# Patient Record
Sex: FEMALE | Race: BLACK OR AFRICAN AMERICAN
[De-identification: names, ages, dates, MRNs, and addresses within clinical notes are randomized per-mention and may not be internally consistent; named-entity substitution may affect disease eponyms.]

---

## 2019-11-28 ENCOUNTER — HOSPITAL ENCOUNTER (EMERGENCY)
Dept: HOSPITAL 62 - ER | Age: 29
Discharge: HOME | End: 2019-11-28
Payer: OTHER GOVERNMENT

## 2019-11-28 VITALS — SYSTOLIC BLOOD PRESSURE: 124 MMHG | DIASTOLIC BLOOD PRESSURE: 70 MMHG

## 2019-11-28 DIAGNOSIS — O20.0: Primary | ICD-10-CM

## 2019-11-28 DIAGNOSIS — R10.30: ICD-10-CM

## 2019-11-28 DIAGNOSIS — Z3A.00: ICD-10-CM

## 2019-11-28 DIAGNOSIS — O99.511: ICD-10-CM

## 2019-11-28 DIAGNOSIS — R10.9: ICD-10-CM

## 2019-11-28 DIAGNOSIS — J45.909: ICD-10-CM

## 2019-11-28 DIAGNOSIS — O26.891: ICD-10-CM

## 2019-11-28 LAB
ADD MANUAL DIFF: NO
ANION GAP SERPL CALC-SCNC: 10 MMOL/L (ref 5–19)
APPEARANCE UR: CLEAR
APTT PPP: (no result) S
BASOPHILS # BLD AUTO: 0.1 10^3/UL (ref 0–0.2)
BASOPHILS NFR BLD AUTO: 0.5 % (ref 0–2)
BILIRUB UR QL STRIP: NEGATIVE
BUN SERPL-MCNC: 10 MG/DL (ref 7–20)
CALCIUM: 9.3 MG/DL (ref 8.4–10.2)
CHLORIDE SERPL-SCNC: 103 MMOL/L (ref 98–107)
CO2 SERPL-SCNC: 24 MMOL/L (ref 22–30)
EOSINOPHIL # BLD AUTO: 0.4 10^3/UL (ref 0–0.6)
EOSINOPHIL NFR BLD AUTO: 3.8 % (ref 0–6)
ERYTHROCYTE [DISTWIDTH] IN BLOOD BY AUTOMATED COUNT: 14 % (ref 11.5–14)
GLUCOSE SERPL-MCNC: 80 MG/DL (ref 75–110)
GLUCOSE UR STRIP-MCNC: NEGATIVE MG/DL
HCT VFR BLD CALC: 38.2 % (ref 36–47)
HGB BLD-MCNC: 13 G/DL (ref 12–15.5)
KETONES UR STRIP-MCNC: 20 MG/DL
LYMPHOCYTES # BLD AUTO: 1.8 10^3/UL (ref 0.5–4.7)
LYMPHOCYTES NFR BLD AUTO: 17.7 % (ref 13–45)
MCH RBC QN AUTO: 28.8 PG (ref 27–33.4)
MCHC RBC AUTO-ENTMCNC: 34.1 G/DL (ref 32–36)
MCV RBC AUTO: 85 FL (ref 80–97)
MONOCYTES # BLD AUTO: 0.5 10^3/UL (ref 0.1–1.4)
MONOCYTES NFR BLD AUTO: 4.9 % (ref 3–13)
NEUTROPHILS # BLD AUTO: 7.3 10^3/UL (ref 1.7–8.2)
NEUTS SEG NFR BLD AUTO: 73.1 % (ref 42–78)
NITRITE UR QL STRIP: NEGATIVE
PH UR STRIP: 5 [PH] (ref 5–9)
PLATELET # BLD: 318 10^3/UL (ref 150–450)
POTASSIUM SERPL-SCNC: 4.1 MMOL/L (ref 3.6–5)
PROT UR STRIP-MCNC: NEGATIVE MG/DL
RBC # BLD AUTO: 4.51 10^6/UL (ref 3.72–5.28)
SP GR UR STRIP: 1.01
TOTAL CELLS COUNTED % (AUTO): 100 %
UROBILINOGEN UR-MCNC: NEGATIVE MG/DL (ref ?–2)
WBC # BLD AUTO: 10 10^3/UL (ref 4–10.5)

## 2019-11-28 PROCEDURE — 86901 BLOOD TYPING SEROLOGIC RH(D): CPT

## 2019-11-28 PROCEDURE — 76817 TRANSVAGINAL US OBSTETRIC: CPT

## 2019-11-28 PROCEDURE — 86900 BLOOD TYPING SEROLOGIC ABO: CPT

## 2019-11-28 PROCEDURE — 36415 COLL VENOUS BLD VENIPUNCTURE: CPT

## 2019-11-28 PROCEDURE — 99284 EMERGENCY DEPT VISIT MOD MDM: CPT

## 2019-11-28 PROCEDURE — 93976 VASCULAR STUDY: CPT

## 2019-11-28 PROCEDURE — 85025 COMPLETE CBC W/AUTO DIFF WBC: CPT

## 2019-11-28 PROCEDURE — 81001 URINALYSIS AUTO W/SCOPE: CPT

## 2019-11-28 PROCEDURE — 80048 BASIC METABOLIC PNL TOTAL CA: CPT

## 2019-11-28 PROCEDURE — 84702 CHORIONIC GONADOTROPIN TEST: CPT

## 2019-11-28 NOTE — ER DOCUMENT REPORT
ED Medical Screen (RME)





- General


Chief Complaint: Vag Bleeding, +preg <12wks


Stated Complaint: ABDOMINAL CRAMPS/VAGINAL BLEEDING


Time Seen by Provider: 11/28/19 11:28


Notes: 





Patient is a 29-year-old female G7, P6 presents to the emergency department for 

vaginal bleeding.  Patient voices she thinks she is approximately 8 weeks 

pregnant.  She did go to her OB/GYN who recently did an ultrasound measuring 

approximately 5 weeks.  Patient voices this morning she had some generalized 

lower abdominal cramping and when she wiped after using the restroom saw bright 

red blood.  Patient voices she did place a pad but has not soaked through said 

pad.





GENERAL: Alert, interacts well. No acute distress.


LUNGS: Clear to auscultation bilaterally, no wheezes, rales, or rhonchi. No 

respiratory distress.


ABDOMEN: Soft, non-tender. Non-distended. Bowel sounds present in all 4 

quadrants.








I have greeted and performed a rapid initial assessment of this patient.  A 

comprehensive ED assessment and evaluation of the patient, analysis of test 

results and completion of the medical decision making process will be conducted 

by additional ED providers.





I have specifically instructed the patient or family members with the patient to

immediately return to any nursing staff should anything change in the patient's 

condition or with their chief complaint.





This medical record was dictated with voice recognizing software.  There may be 

grammatical, syntax errors that are unintended.





- Related Data


Allergies/Adverse Reactions: 


                                        





lactose Allergy (Verified 11/28/19 11:27)


   


latex Allergy (Verified 11/28/19 11:27)


   


No Known Drug Allergies Allergy (Verified 11/28/19 11:27)


   











Physical Exam





- Vital signs


Vitals: 





                                        











Temp Pulse BP Pulse Ox


 


 97.9 F   66   116/71   100 


 


 11/28/19 11:26  11/28/19 11:26  11/28/19 11:26  11/28/19 11:26














Course





- Vital Signs


Vital signs: 





                                        











Temp Pulse Resp BP Pulse Ox


 


 97.9 F   66      116/71   100 


 


 11/28/19 11:26  11/28/19 11:26     11/28/19 11:26  11/28/19 11:26

## 2019-11-28 NOTE — ER DOCUMENT REPORT
ED GI/





- General


Chief Complaint: Vag Bleeding, +preg <12wks


Stated Complaint: ABDOMINAL CRAMPS/VAGINAL BLEEDING


Time Seen by Provider: 11/28/19 11:28


Primary Care Provider: 


BENY ANDUJAR MD [Primary Care Provider] - Follow up as needed


Notes: 





Patient is a 29-year-old female G7, P6 who presents to the emergency department 

with a chief complaint of vaginal bleeding.  Patient reports this morning she 

felt some lower abdominal pressure and as she went to urinate she wiped with the

toilet paper and noticed bright red blood on the tissue.  Patient denies blood 

clots.  Patient reports she did put a pad on afterwards but has not noticed any 

more bleeding.  Patient reports she did have her last menstrual cycle on October 7.  She reports that she did follow-up with [facility] on 

November 25 in which she did have an ultrasound.  She states at that time she 

was told she was 5 weeks and 2 days pregnant.  Patient reports that the lower 

abdominal cramping started today.  Patient denies urinary symptoms.  Patient 

denies vaginal discharge prior to the bleeding.


TRAVEL OUTSIDE OF THE [place] IN LAST 30 DAYS: No





- Related Data


Allergies/Adverse Reactions: 


                                        





lactose Allergy (Verified 11/28/19 11:35)


   


latex Allergy (Verified 11/28/19 11:35)


   


No Known Drug Allergies Allergy (Verified 11/28/19 11:35)


   


nut - unspecified Allergy (Verified 11/28/19 11:35)


   


shellfish derived Allergy (Verified 11/28/19 11:35)


   











Past Medical History





- General


Information source: Patient





- Social History


Smoking Status: Never Smoker


Chew tobacco use (# tins/day): No


Frequency of alcohol use: None


Drug Abuse: None


Lives with: Spouse/Significant other


Family History: None


Patient has suicidal ideation: No


Patient has homicidal ideation: No





- Past Medical History


Cardiac Medical History: Reports: None


Pulmonary Medical History: Reports: Hx Asthma


EENT Medical History: Reports: None


Neurological Medical History: Reports: None


Endocrine Medical History: Reports: None


Renal/ Medical History: Reports: None


Malignancy Medical History: Reports: None


GI Medical History: Reports: None


Musculoskeletal Medical History: Reports None


Skin Medical History: Reports None


Psychiatric Medical History: Reports: None


Traumatic Medical History: Reports: None


Infectious Medical History: Reports: None


Past Surgical History: Reports: None





Review of Systems





- Review of Systems


Constitutional: No symptoms reported


EENT: No symptoms reported


Cardiovascular: No symptoms reported


Respiratory: See HPI


Gastrointestinal: See HPI


Genitourinary: No symptoms reported


Female Genitourinary: See HPI


Musculoskeletal: No symptoms reported


Skin: No symptoms reported


Hematologic/Lymphatic: No symptoms reported


Neurological/Psychological: No symptoms reported





Physical Exam





- Vital signs


Vitals: 


                                        











Temp Pulse BP Pulse Ox


 


 97.9 F   66   116/71   100 


 


 11/28/19 11:26  11/28/19 11:26  11/28/19 11:26  11/28/19 11:26














- Notes


Notes: 





GENERAL: Well-appearing, well-nourished and in no acute distress.


HEAD: Atraumatic, normocephalic.


EYES: Pupils equal round and reactive to light, extraocular movements intact, 

sclera anicteric, conjunctiva are normal.


ENT: Nares patent, oropharynx clear without exudates.  Moist mucous membranes.


NECK: Normal range of motion, supple without lymphadenopathy or JVD.


LUNGS: Breath sounds clear to auscultation bilaterally and equal.  No wheezes 

rales or rhonchi.


HEART: Regular rate and rhythm without murmurs, rubs or gallops.


ABDOMEN: Soft, nontender, normoactive bowel sounds.  No guarding, no rebound.  

No masses appreciated.


BACK: No cervical, thoracic, lumbar midline tenderness.  No saddle anesthesia, 

normal distal neurovascular exam.


GENITOURINARY: Deferred.


EXTREMITIES: Normal range of motion, no pitting or edema.  No clubbing or 

cyanosis.


NEUROLOGICAL: Cranial nerves II through XII grossly intact.  Normal speech, 

normal gait.


PSYCH: Normal mood, normal affect.


SKIN: Warm, Dry, normal turgor, no rashes or lesions noted.





Course





- Re-evaluation


Re-evalutation: 





11/28/19 13:38


I did speak with Dr. Salazar, our on-call OBGYN to report the patient's quant and 

ultrasound read.  She states that it sounds like the patient is having impending

miscarriage.  She reports that the patient needs to follow-up at [facility] on Monday for repeat quant and ultrasound.  She states 

that the patient can take ibuprofen 800 mg 3 times a day as needed for her 

discomfort, as it does sound like she is miscarrying due to the abnormal 

gestational sac.  She states for the patient to return to the emergency 

department over the weekend if she is saturating > 2 pads in 1 hour.  I did 

discuss the findings with the patient.  I did give her strict return 

precautions.  Patient verbalizes understanding.





Patient is O+, RhoGam is not indicated.





11/28/19 13:56








- Vital Signs


Vital signs: 


                                        











Temp Pulse Resp BP Pulse Ox


 


 97.9 F   66      116/71   100 


 


 11/28/19 11:26  11/28/19 11:26     11/28/19 11:26  11/28/19 11:26














- Laboratory


Result Diagrams: 


                                 11/28/19 12:20





                                 11/28/19 12:20


Laboratory results interpreted by me: 


                                        











  11/28/19 11/28/19





  12:20 12:20


 


Sodium  136.9 L 


 


Creatinine  0.41 L 


 


Beta HCG, Quant  9697.60 H 


 


Urine Ketones   20 H


 


Urine Blood   SMALL H














- Diagnostic Test


Radiology reviewed: Reports reviewed


Radiology results interpreted by me: 





11/28/19 13:01





                                        





Transvaginal US  11/28/19 11:29


IMPRESSION:  No identifiable fetal pole is seen.  Cannot exclude a molar 

pregnancy.  Follow-up as clinically indicated.


 














Discharge





- Discharge


Clinical Impression: 


 Vaginal bleeding, Threatened miscarriage





Condition: Stable


Disposition: HOME, SELF-CARE


Additional Instructions: 


Threatened Miscarriage





*Today you are seen in the emergency department for vaginal bleeding.  Your 

ultrasound was concerning for an impending miscarriage.  I did speak with our on

call OB/GYN who does work for [facility].  She does recommend

following up with the office on Monday to have a repeat quant and ultrasound.  

She states that you can take 800 mg of ibuprofen 3 times a day as needed at this

point.  Only take this if you are having pain and discomfort.  If you do not 

want to take ibuprofen you can take Tylenol.  Please return to the emergency 

department if you have severe abdominal pain, if you are soaking through more 

than 2 pads that is saturated in 1 hour.  You may start to have more vaginal 

bleeding that is consistent with a heavy menstrual cycle.


 


Do not douche or have sex for at least a week, or until OK'd by the doctor. 

*Strict pelvic rest until seen by [facility]*


C


all the doctor or return for re-examination if there is an increase in bleeding 

or cramping, or passage of tissue.





Prescriptions: 


Ibuprofen [Motrin 800 mg Tablet] 800 mg PO Q8H PRN #30 tab


 PRN Reason: 


Referrals: 


BENY ANDUJAR MD [Primary Care Provider] - Follow up as needed

## 2019-11-28 NOTE — RADIOLOGY REPORT (SQ)
EXAM DESCRIPTION:  U/S OB TRANSVAG W/DOPPLER



COMPLETED DATE/TIME:  11/28/2019 12:22 pm



REASON FOR STUDY:  +preg and bleeding



COMPARISON:  None.



TECHNIQUE:  Transvaginal static and realtime grayscale images acquired of the pelvis. Additional shonda
cted spectral and color Doppler images recorded. All images stored on PACs.

bHCG: Pending.

CLINICAL DATES:  LMP 10/7/2019 7 weeks 3 days



LIMITATIONS:  None.



FINDINGS:  There is the appearance of an abnormal gestational sac.  No fetal pole is present.  There 
is an 18 x 29 x 31 mm mass of mixed echogenicity either in the gestational sac or adjacent to it.

UTERUS: See above.

CERVICAL LENGTH: 2.7 cm.   Closed.

RIGHT ADNEXA: Normal ovary with normal vascular flow.  3.1 x 2.3 x 1.8 cm.

No adnexal free fluid.

No adnexal masses.

LEFT ADNEXA: Normal ovary with normal vascular flow.  2.3 x 3.1 x 3.7 cm.  There is an 18 x 18 x 24 m
m cyst.

No adnexal free fluid.

No adnexal masses.

FREE FLUID: None.

OTHER: No other significant finding.



IMPRESSION:  No identifiable fetal pole is seen.  Cannot exclude a molar pregnancy.  Follow-up as cli
nically indicated.



TECHNICAL DOCUMENTATION:  JOB ID:  [id number]

 2011 [email or web address]- All Rights Reserved                            rev-5/18



Reading location - IP/workstation name: [email or web address]

## 2020-04-01 ENCOUNTER — HOSPITAL ENCOUNTER (OUTPATIENT)
Dept: HOSPITAL 62 - RDC | Age: 30
End: 2020-04-01
Attending: NURSE PRACTITIONER
Payer: OTHER GOVERNMENT

## 2020-04-01 VITALS — DIASTOLIC BLOOD PRESSURE: 66 MMHG | SYSTOLIC BLOOD PRESSURE: 106 MMHG

## 2020-04-01 DIAGNOSIS — Z3A.23: ICD-10-CM

## 2020-04-01 DIAGNOSIS — J06.9: ICD-10-CM

## 2020-04-01 DIAGNOSIS — R05: ICD-10-CM

## 2020-04-01 DIAGNOSIS — M79.10: ICD-10-CM

## 2020-04-01 DIAGNOSIS — J45.909: ICD-10-CM

## 2020-04-01 DIAGNOSIS — O99.512: Primary | ICD-10-CM

## 2020-04-01 DIAGNOSIS — Z20.828: ICD-10-CM

## 2020-04-01 DIAGNOSIS — R09.89: ICD-10-CM

## 2020-04-01 DIAGNOSIS — R06.02: ICD-10-CM

## 2020-04-01 DIAGNOSIS — R11.0: ICD-10-CM

## 2020-04-01 DIAGNOSIS — R10.9: ICD-10-CM

## 2020-04-01 LAB
A TYPE INFLUENZA AG: NEGATIVE
B INFLUENZA AG: NEGATIVE

## 2020-04-01 PROCEDURE — 87070 CULTURE OTHR SPECIMN AEROBIC: CPT

## 2020-04-01 PROCEDURE — 87635 SARS-COV-2 COVID-19 AMP PRB: CPT

## 2020-04-01 PROCEDURE — 87804 INFLUENZA ASSAY W/OPTIC: CPT

## 2020-04-01 PROCEDURE — 87880 STREP A ASSAY W/OPTIC: CPT

## 2020-04-01 NOTE — ER RDC ASSESSMENT REPORT
Intake





- In the Last 14 days


Have you traveled outside [place]?: No


Have you been in close contact with someone CONFIRMED: No


Worked in Healthcare?: No





- Symptoms


Subjective Fever(Felt feverish): No


Chills: Yes


Muscule Aches: Yes


Runny Nose: Yes


Sore Throat: Yes


Cough (New or worsening chronic cough): Yes


Shortness of breath: Yes


Nausea or Vomiting: Yes


Headache: Yes


Abdominal Pain: Yes


Diarrhea(3 or more loose stools in last 24 hours): No





- Do you have any of the following


Chronic lung disease: Asthma or emphysema or COPD: Yes


Chronic Lung Disease Comment: 





asthma


Cystic Fibrosis: No


Diabetes: No


High Blood Pressure: No


Cardiovascular Disease: No


Chronic Kidney Disease: No


Chronic Liver Disease: No


Chronic blood disorder like Sickle Cell Disease: No


Weak immune system due to disease or medication: No


Neurologic condition that limits movement: No


Developmental delay - Moderate to Severe: No


Recent (within past 2 weeks) or current Pregnancy: No


--If current: Trimester: 2nd


Pregnancy Comment: 





23 weeks


Morbid Obesity (>100 pounds over ideal weight): No





- Objective


Temperature: 96.7 F


Pulse Rate: 78


Respiratory Rate: 14


Blood Pressure: 106/66


O2 Sat by Pulse Oximetry: 96


Objective: 


Given above, testing performed: 








flu A/B, rapid strep all negative





COVID19 sent to [facility]























If Testing Performed:


Test Specimen Type Sent to


[facility] lab





Disposition: Home; Selfcare





General





- General


Chief Complaint: Flu Symptoms


Stated Complaint: dry cough, SOB x 1 day, cold symptoms


Time Seen by Provider: 04/01/20 10:30


Mode of Arrival: Ambulatory


Information source: Patient





- [facility]


Patient complains to provider of: Dry cough, rhinorrhea, headache, emesis x1, 

shortness of breath


Onset: Last week - cold symptoms x 5 days, couhg/sob with exertion x 1 day


Onset/Duration: Gradual


Quality of pain: No pain


Associated symptoms: Body/muscle aches, Chills, Nonproductive cough, Headache, 

Nausea, Vomiting, Rhinnorhea, Shortness of breath, Sore throat


Exacerbated by: Movement, Coughing


Relieved by: Remaining still, Other - albuterol TID


Similar symptoms previously: No


Recently seen / treated by doctor: No





- Related Data


Allergies/Adverse Reactions: 


                                        





lactose Allergy (Verified 11/28/19 11:35)


   


latex Allergy (Verified 11/28/19 11:35)


   


No Known Drug Allergies Allergy (Verified 11/28/19 11:35)


   


nut - unspecified Allergy (Verified 11/28/19 11:35)


   


shellfish derived Allergy (Verified 11/28/19 11:35)


   








Home Medications: Methimazole 5 mg p.o. daily, albuterol inhaler as needed every

4 H





Past Medical History





- General


Information source: Patient





- Social History


Smoking Status: Never Smoker


Lives with: Family


Family History: None





- Past Medical History


Cardiac Medical History: Reports: Hx Heart Murmur


Pulmonary Medical History: Reports: Hx Asthma


EENT Medical History: Reports: None


Neurological Medical History: Reports: None


Endocrine Medical History: Reports: Hx Hyperthyroidism


Renal/ Medical History: Reports: None


Malignancy Medical History: Reports: None


GI Medical History: Reports: None


Musculoskeletal Medical History: Reports None


Skin Medical History: Reports None


Psychiatric Medical History: Reports: None


Traumatic Medical History: Reports: None


Infectious Medical History: Reports: None


Past Surgical History: Reports: None





Physical Exam





- Vital signs


Interpretation: Normal





- General


General appearance: Appears well, Alert


In distress: None


Notes: 





PHYSICAL EXAMINATION: 


GENERAL: Well-appearing and in no acute distress. 


HEAD: Atraumatic, normocephalic. 


EYES: sclera anicteric, conjunctiva are normal. 


ENT: nares patent. + clear rhinorrhea. Moist mucous membranes. 


NECK: Normal range of motion, supple without lymphadenopathy 


LUNGS: CTAB and equal. No wheezes rales or rhonchi. 


HEART: Regular rate and rhythm, + murmur 


ABDOMEN: Gravid, nontender, normal bowel sounds, no guarding. 


EXTREMITIES: Normal range of motion, no pitting edema. No cyanosis. 


NEUROLOGICAL: Cranial nerves grossly intact. Normal speech. 


PSYCH: Normal mood, normal affect. 


SKIN: Warm, Dry, normal turgor.








Patient Education/Counseling


Counseling/Education: 





Patient presents with upper respiratory symptoms worrisome for possible Covid 

19.  Patient does not have emergency worrying symptoms such as moderate to 

severe dyspnea or shortness of breath, chest pain, pressure, confusion or 

cyanosis.  Patient appears suitable for discharge.  Patient's vital signs are 

stable and patient is nontoxic in appearance.  Good return precautions have been

discussed with patient, patient verbalized understanding and is agreeable with 

discharge plan of care at this time.


Guidance for worsening S/SX: 





As a person under investigation for Covid 19, the [facility] of 

Health and Human Services, division of public health advises you to adhere to 

the following guidance until your test results are reported to you.  If your 

test result is positive, you will receive additional information from your 

provider and your local health department at that time.


 


Remain at home until you are cleared by the health provider or public health 

authorities.


 


Keep a log of visitors to your home, notify any visitors to your home of your 

isolation status.


 


If you plan to move to a new address or leave the county, notify the local 

health department in your County.


 


Call your doctor or seek care if you have an urgent medical need.  Before 

seeking medical care, call ahead to get instructions from the provider before 

arriving at the medical office clinic or hospital.  Notify them that you are 

being tested for the virus that causes Covid 19 so that arrangements can be 

made, as necessary, to prevent transmission to others in the healthcare setting.

 Next, notify the local health department in your county.


 


If a medical emergency arises and you need to call 911, inform the first 

responders that you are being tested for the virus that causes Covid 19.  Next, 

notify the local health department in your county.





RDC Discharge





- Discharge


Clinical Impression: 


 COVID 19 screen





URI (upper respiratory infection)


Qualifiers:


 URI type: unspecified URI Qualified Code(s): J06.9 - Acute upper respiratory 

infection, unspecified





Condition: Stable


Disposition: Home; Selfcare

## 2020-06-02 ENCOUNTER — HOSPITAL ENCOUNTER (EMERGENCY)
Dept: HOSPITAL 62 - ER | Age: 30
Discharge: HOME | End: 2020-06-02
Payer: OTHER GOVERNMENT

## 2020-06-02 VITALS — SYSTOLIC BLOOD PRESSURE: 114 MMHG | DIASTOLIC BLOOD PRESSURE: 70 MMHG

## 2020-06-02 DIAGNOSIS — Z91.013: ICD-10-CM

## 2020-06-02 DIAGNOSIS — O99.513: ICD-10-CM

## 2020-06-02 DIAGNOSIS — J45.909: ICD-10-CM

## 2020-06-02 DIAGNOSIS — Z3A.33: ICD-10-CM

## 2020-06-02 DIAGNOSIS — Z91.018: ICD-10-CM

## 2020-06-02 DIAGNOSIS — O26.893: Primary | ICD-10-CM

## 2020-06-02 DIAGNOSIS — Z91.040: ICD-10-CM

## 2020-06-02 DIAGNOSIS — R07.2: ICD-10-CM

## 2020-06-02 LAB
ABSOLUTE LYMPHOCYTES# (MANUAL): 1.6 10^3/UL (ref 0.5–4.7)
ABSOLUTE MONOCYTES # (MANUAL): 0.4 10^3/UL (ref 0.1–1.4)
ADD MANUAL DIFF: YES
ALBUMIN SERPL-MCNC: 3.3 G/DL (ref 3.5–5)
ALP SERPL-CCNC: 103 U/L (ref 38–126)
ANION GAP SERPL CALC-SCNC: 4 MMOL/L (ref 5–19)
ANISOCYTOSIS BLD QL SMEAR: SLIGHT
APPEARANCE UR: (no result)
APTT PPP: YELLOW S
AST SERPL-CCNC: 24 U/L (ref 14–36)
BASOPHILS NFR BLD MANUAL: 0 % (ref 0–2)
BILIRUB DIRECT SERPL-MCNC: 0 MG/DL (ref 0–0.4)
BILIRUB SERPL-MCNC: 0.4 MG/DL (ref 0.2–1.3)
BILIRUB UR QL STRIP: NEGATIVE
BUN SERPL-MCNC: 9 MG/DL (ref 7–20)
CALCIUM: 8.9 MG/DL (ref 8.4–10.2)
CHLORIDE SERPL-SCNC: 105 MMOL/L (ref 98–107)
CK SERPL-CCNC: 28 U/L (ref 30–135)
CO2 SERPL-SCNC: 24 MMOL/L (ref 22–30)
EOSINOPHIL NFR BLD MANUAL: 6 % (ref 0–6)
ERYTHROCYTE [DISTWIDTH] IN BLOOD BY AUTOMATED COUNT: 14.8 % (ref 11.5–14)
GLUCOSE SERPL-MCNC: 78 MG/DL (ref 75–110)
GLUCOSE UR STRIP-MCNC: NEGATIVE MG/DL
HCT VFR BLD CALC: 34.8 % (ref 36–47)
HGB BLD-MCNC: 12 G/DL (ref 12–15.5)
KETONES UR STRIP-MCNC: (no result) MG/DL
MCH RBC QN AUTO: 27.4 PG (ref 27–33.4)
MCHC RBC AUTO-ENTMCNC: 34.5 G/DL (ref 32–36)
MCV RBC AUTO: 80 FL (ref 80–97)
MONOCYTES % (MANUAL): 4 % (ref 3–13)
NITRITE UR QL STRIP: NEGATIVE
OVALOCYTES BLD QL SMEAR: SLIGHT
PH UR STRIP: 6 [PH] (ref 5–9)
PLATELET # BLD: 206 10^3/UL (ref 150–450)
PLATELET CLUMP BLD QL SMEAR: PRESENT
PLATELET COMMENT: ADEQUATE
PLATELET LARGE: PRESENT
POIKILOCYTOSIS BLD QL SMEAR: SLIGHT
POTASSIUM SERPL-SCNC: 3.8 MMOL/L (ref 3.6–5)
PROT SERPL-MCNC: 6.6 G/DL (ref 6.3–8.2)
PROT UR STRIP-MCNC: NEGATIVE MG/DL
RBC # BLD AUTO: 4.37 10^6/UL (ref 3.72–5.28)
SEGMENTED NEUTROPHILS % (MAN): 74 % (ref 42–78)
SP GR UR STRIP: 1.02
TOTAL CELLS COUNTED BLD: 100
UROBILINOGEN UR-MCNC: NEGATIVE MG/DL (ref ?–2)
VARIANT LYMPHS NFR BLD MANUAL: 16 % (ref 13–45)
WBC # BLD AUTO: 10.1 10^3/UL (ref 4–10.5)

## 2020-06-02 PROCEDURE — 81001 URINALYSIS AUTO W/SCOPE: CPT

## 2020-06-02 PROCEDURE — 84484 ASSAY OF TROPONIN QUANT: CPT

## 2020-06-02 PROCEDURE — 85025 COMPLETE CBC W/AUTO DIFF WBC: CPT

## 2020-06-02 PROCEDURE — 80053 COMPREHEN METABOLIC PANEL: CPT

## 2020-06-02 PROCEDURE — 93010 ELECTROCARDIOGRAM REPORT: CPT

## 2020-06-02 PROCEDURE — 36415 COLL VENOUS BLD VENIPUNCTURE: CPT

## 2020-06-02 PROCEDURE — 82550 ASSAY OF CK (CPK): CPT

## 2020-06-02 PROCEDURE — 99285 EMERGENCY DEPT VISIT HI MDM: CPT

## 2020-06-02 PROCEDURE — 93005 ELECTROCARDIOGRAM TRACING: CPT

## 2024-12-16 NOTE — ER DOCUMENT REPORT
Entered by SHREE BOYCE SCRIBE  06/02/20 0854 





Acting as scribe for:SWATHI LAZARO MD





ED General





- General


Chief Complaint: Chest Pressure


Stated Complaint: CHEST PRESSURE


Time Seen by Provider: 06/02/20 08:51


Primary Care Provider: 


BENY ANDUJAR MD [Primary Care Provider] - Follow up as needed


Mode of Arrival: Ambulatory


Information source: Patient


Notes: 





This 30 year old female patient G7,P6,A0 presents to the emergency department 

today with complaints of "chest cramping" which began last night. Patient 

describes her upper substernal "chest cramping" as "contractions in my chest". 

Patient mentions that it is "sporadic now, but last night it was constant". 

Patient's LMP was 10/7/19.  She is 34 weeks by LMP.





Patient states that she went to [facility] to be evaluated 

this morning and she was sent here.  Patient mentions that she was having "minor

contractions" sporadically last night.





Patient states she was told she had borderline gestational diabetes and has been

taking her blood sugars at home and they have never been above the high 100s. 





The patient reports that she has 6 children at home with the oldest only 12 

years old.  She states she has no one to help her with her children and is 

concerned about the chest discomfort and pain she has been having.  She states 

that she needs a letter detailing all of her medical problems to justify having 

her spouse reassigned from his duty station in [place], [place] to 

this area.


TRAVEL OUTSIDE OF THE [place] IN LAST 30 DAYS: No





- Related Data


Allergies/Adverse Reactions: 


                                        





lactose Allergy (Verified 11/28/19 11:35)


   


latex Allergy (Verified 11/28/19 11:35)


   


No Known Drug Allergies Allergy (Verified 11/28/19 11:35)


   


nut - unspecified Allergy (Verified 11/28/19 11:35)


   


shellfish derived Allergy (Verified 11/28/19 11:35)


   











Past Medical History





- General


Information source: Patient





- Social History


Smoking Status: Never Smoker


Cigarette use (# per day): No


Frequency of alcohol use: None


Drug Abuse: None


Lives with: Other - children


Family History: None





- Past Medical History


Cardiac Medical History: Reports: Hx Heart Murmur


Pulmonary Medical History: Reports: Hx Asthma


Endocrine Medical History: Reports: Hx Hyperthyroidism - Hashimoto's


Past Surgical History: Reports: Hx Appendectomy, Hx Oral Surgery - Third molar 

extractions





Review of Systems





- Review of Systems


Constitutional: No symptoms reported


EENT: No symptoms reported


Cardiovascular: See HPI, Chest pain


Respiratory: No symptoms reported


Gastrointestinal: No symptoms reported


Genitourinary: No symptoms reported


Female Genitourinary: See HPI, Last menstrual period - 10/7/19, Pregnant


Musculoskeletal: No symptoms reported


Skin: No symptoms reported


Hematologic/Lymphatic: No symptoms reported


Neurological/Psychological: No symptoms reported


-: Yes All other systems reviewed and negative





Physical Exam





- Vital signs


Vitals: 


                                        











Resp BP Pulse Ox


 


 17   123/77   98 


 


 06/02/20 08:43  06/02/20 08:43  06/02/20 08:43














- Notes


Notes: 





Physical Exam:


 


General: Alert, appears well.  Gravid female.


 


HEENT: Normocephalic. Atraumatic. PERRL. Extraocular movements intact. 

Oropharynx clear.


 


Neck: Supple. Non-tender.


 


Respiratory: No respiratory distress. Clear and equal breath sounds bilaterally.


 


Cardiovascular: Regular rate and rhythm. 


 


Abdominal: Gravid uterus. no distension. Normal Bowel Sounds. 


 


Back: No gross abnormalities. 


 


Extremities: Moves all four extremities.


Upper extremities: Normal inspection. Normal ROM.  


Lower extremities: Normal inspection. No edema. Normal ROM.


 


Neurological: Normal cognition. AAOx4. Normal speech.  


 


Psychological: Normal affect. Normal Mood. 


 


Skin: Warm. Dry. Normal color.





Course





- Re-evaluation


Re-evalutation: 





06/02/20 11:39


I discussed the patient's case at length with Dr. Snowden from the [facility].  She agrees that there is nothing in her history and 

physical and work-up today that would suggest a reason to write a letter to her 

spouse's command.  She recommends that have the patient follow back up at their 

clinic to further discuss the problems surrounding this pregnancy.











- Vital Signs


Vital signs: 


                                        











Temp Pulse Resp BP Pulse Ox


 


 98.2 F      17   123/77   98 


 


 06/02/20 08:46     06/02/20 08:43  06/02/20 08:43  06/02/20 08:43














- Laboratory


Result Diagrams: 


                                 06/02/20 09:26





                                 06/02/20 09:26


Laboratory results interpreted by me: 


                                        











  06/02/20 06/02/20 06/02/20





  09:26 09:26 09:26


 


Hct  34.8 L  


 


RDW  14.8 H  


 


Sodium   133.4 L 


 


Anion Gap   4 L 


 


Creatinine   0.31 L 


 


Creatine Kinase   28 L 


 


Albumin   3.3 L 


 


Urine Ketones    TRACE H


 


Ur Leukocyte Esterase    TRACE H














- EKG Interpretation by Me


EKG shows normal: Sinus rhythm, Axis, Intervals, QRS Complexes, ST-T Waves


Rate: Normal - 83


Rhythm: NSR





Discharge





- Discharge


Clinical Impression: 


 Substernal chest pain, 33 weeks gestation of pregnancy





Condition: Stable


Disposition: HOME, SELF-CARE


Additional Instructions: 


Chest Pain of Unclear Cause





   The exact cause of your chest pain isn't clear. Fortunately, there is no 

evidence of a dangerous medical condition.  Further testing may be required to 

find the source of the pain.


   Most often, we find that this pain is coming from the chest wall -- the 

muscles or rib joints in the chest.  But chest pain can come from the lung and 

lung lining, the esophagus, the heart valves or heart lining, and even the 

stomach or gallbladder.


   Rest.  Eat lightly until the pain is gone.  We may prescribe medicine for 

pain and inflammation.


   You should call the physician immediately if the pain radiates to the 

shoulder, jaw or arms; if you start to run a fever or develop a cough; or if you

develop shortness of breath, or other new or alarming symptoms.








******************

********************************************************************************


***********************





Your evaluation today did not show any abnormalities or explanation for your 

chest discomfort.


The EKG and lab work including cardiac enzymes were all normal.





I did discuss your case with Dr. Katy Snowden from the [facility], and she recommended that you follow-up with them in the office to 

discuss problems surrounding this pregnancy.


Any letters to your spouses command would need to come from either your primary 

care provider or your OB/GYN doctors.








RETURN TO THE EMERGENCY ROOM IF ANY NEW OR WORSENING SYMPTOMS.








Referrals: 


BENY ANDUJAR MD [Primary Care Provider] - Follow up as needed





I personally performed the services described in the documentation, reviewed and

edited the documentation which was dictated to the scribe in my presence, and it

accurately records my words and actions. Last office visit was 11/18/24    Reports recent death in the family requesting prescription for Lunesta.